# Patient Record
Sex: MALE | Race: WHITE
[De-identification: names, ages, dates, MRNs, and addresses within clinical notes are randomized per-mention and may not be internally consistent; named-entity substitution may affect disease eponyms.]

---

## 2018-07-02 ENCOUNTER — HOSPITAL ENCOUNTER (OUTPATIENT)
Dept: HOSPITAL 33 - SDC | Age: 53
Discharge: HOME | End: 2018-07-02
Attending: SURGERY
Payer: COMMERCIAL

## 2018-07-02 VITALS — DIASTOLIC BLOOD PRESSURE: 74 MMHG | HEART RATE: 56 BPM | OXYGEN SATURATION: 96 % | SYSTOLIC BLOOD PRESSURE: 117 MMHG

## 2018-07-02 DIAGNOSIS — Z79.01: ICD-10-CM

## 2018-07-02 DIAGNOSIS — I48.91: ICD-10-CM

## 2018-07-02 DIAGNOSIS — D17.1: Primary | ICD-10-CM

## 2018-07-02 DIAGNOSIS — I50.9: ICD-10-CM

## 2018-07-02 DIAGNOSIS — J44.9: ICD-10-CM

## 2018-07-02 DIAGNOSIS — I10: ICD-10-CM

## 2018-07-02 DIAGNOSIS — Z79.899: ICD-10-CM

## 2018-07-02 DIAGNOSIS — R20.8: ICD-10-CM

## 2018-07-02 LAB
ALBUMIN SERPL-MCNC: 4.5 G/DL (ref 3.5–5)
ALP SERPL-CCNC: 60 U/L (ref 38–126)
ALT SERPL-CCNC: 20 U/L (ref 0–50)
ANION GAP SERPL CALC-SCNC: 14.7 MEQ/L (ref 5–15)
AST SERPL QL: 16 U/L (ref 17–59)
BILIRUB BLD-MCNC: 0.5 MG/DL (ref 0.2–1.3)
BUN SERPL-MCNC: 18 MG/DL (ref 9–20)
CALCIUM SPEC-MCNC: 9.8 MG/DL (ref 8.4–10.2)
CHLORIDE SERPL-SCNC: 102 MMOL/L (ref 98–107)
CO2 SERPL-SCNC: 27 MMOL/L (ref 22–30)
CREAT SERPL-MCNC: 1.12 MG/DL (ref 0.66–1.25)
GLUCOSE SERPL-MCNC: 104 MG/DL (ref 74–106)
POTASSIUM SERPLBLD-SCNC: 4 MMOL/L (ref 3.5–5.1)
PROT SERPL-MCNC: 7.2 G/DL (ref 6.3–8.2)
SODIUM SERPL-SCNC: 140 MMOL/L (ref 137–145)

## 2018-07-02 PROCEDURE — 94250: CPT

## 2018-07-02 PROCEDURE — 80053 COMPREHEN METABOLIC PANEL: CPT

## 2018-07-02 NOTE — HP
DATE OF SURGERY:  07/02/2018



HISTORY OF PRESENT ILLNESS:  The patient is a 52 year-old who had some aches and pains 
left upper quadrant. No obvious hernia on CT scan. Question whether he has got lipoma or 
other abdominal wall nodule versus occult hernia not showing up on CT scan.   



PAST MEDICAL HISTORY:  Congestive heart failure, history of atrial fibrillation, 
tachycardia, hypertension, stage III chronic obstructive pulmonary disease. 



PAST SURGICAL HISTORY: Back surgery, foot surgery. He had a cath in the past. 



MEDICATIONS:  Includes amiodarone, Breo Ellipta, carvedilol, Eliquis, famotidine, 
lisinopril, oxycodone, Pravastatin, Spironolactone, Ventolin HFA..



ALLERGIES:  NKDA.

  

FAMILY HISTORY:  Negative. 



SOCIAL HISTORY:  No smoking or alcohol abuse currently. 



REVIEW OF SYSTEMS:  Twelve systems reviewed. No chest pain or palpitations. Pertinent for 
chronic lung disease, heart disease as mentioned above.  



PHYSICAL EXAMINATION:  

GENERAL:  No acute distress.

HEENT: Sclerae nonicteric.

NECK:  No JVD.

CHEST: Equal excursion, nonlabored breathing. 

CVS:  Regular rate and rhythm. 

ABDOMEN:  Soft. Tenderness in left upper quadrant. Unclear whether he has got a lipoma 
there versus small occult hernia.  

EXTREMITIES:  No significant edema.

NEURO:  Alert, oriented, moving extremities symmetrically. No gross motor deficits noted.

 

IMPRESSION:  Abdominal wall pain question lipoma or other subcu mass versus very occult 
hernia. I feel the patient will benefit from abdominal wall exploration given his 
increased symptoms as well as excisional biopsy of subcu mass or lipoma as well if there 
is occult hernia repair of occult hernia with possible mesh depending on operative 
findings. He was explained in detail including but not limited to bleeding or infection, 
risk of hematoma or seroma formation, possibility this procedure does not improve his 
aches or pains, general risk of anesthesia, deep venous thrombosis, pulmonary embolism or 
pneumonia. Possibility if he has occult hernia risk of mesh infection with possible mesh 
removal, risk of hernia recurrence as well as general risk of aches, pains, burning, 
numbness possible long term or chronic in nature, possibility the current aches and pains 
may not be improved that he could have new aches and pains from scar and incision.  He 
understands all the above, accepts the risks and agrees to the planned procedure and will 
proceed with abdominal wall exploration, excisional biopsy of subcutaneous mass or lipoma 
possible repair of any occult hernia identified at the time of procedure with possible 
mesh.

## 2018-07-03 NOTE — OP
SURGERY DATE/TIME:    07/02/2018  1200



PREOPERATIVE DIAGNOSIS:    Enlarging symptomatic subcutaneous mass left upper quadrant and 
lower left chest. 



POSTOPERATIVE DIAGNOSIS: Enlarging symptomatic subcutaneous mass left upper quadrant and 
lower left chest. 



PROCEDURE:  Excisional biopsy of left upper quadrant/lower chest lipoma (approximately 6 
cm) with intermediate closure. 



SURGEON:        Dr. Juvencio Briones.



ASSISTANT:         Rj Huber, Medical Student III. 



ANESTHESIA:    General.



ESTIMATED BLOOD LOSS:    Minimal.    



INDICATIONS:  As noted above. Risks and benefits explained in detail and not limited to 
and consent obtained. The site is confirmed and marked with the patient in the 
preoperative holding area. He had prior CT scan that did not show any obvious occult 
hernia in the area. 

     

DESCRIPTION OF PROCEDURE AND FINDINGS: The patient is taken to the operating room. General 
anesthesia induced. Abdomen and chest were prepped and draped in usual sterile fashion. 
After official time out and no disagreement with planned procedure, a transverse incision 
made overlying the area of dissection carried down through subcutaneous tissue and 
circumferentially around this lobulated denser lipomatous tissue dissecting off the 
underlying muscle and fascia. The specimen is passed off. It measured about 6 cm in size. 
Pin point cautery. Good hemostasis noted. The deep and superficial subcu closed with 
interrupted 3-0 Vicryl down to the level of the fascia. Skin closed with 4-0 Vicryl in 
subcuticular fashion. Steri-Strips and sterile dressing applied. 0.25% Marcaine local 
injected along the skin incision fascial defects. The patient tolerated the procedure 
well. There were no immediate complications. Findings discussed with the family out in the 
waiting area.